# Patient Record
Sex: FEMALE | URBAN - METROPOLITAN AREA
[De-identification: names, ages, dates, MRNs, and addresses within clinical notes are randomized per-mention and may not be internally consistent; named-entity substitution may affect disease eponyms.]

---

## 2021-06-16 ENCOUNTER — OFFICE VISIT (OUTPATIENT)
Dept: URBAN - METROPOLITAN AREA TELEHEALTH 2 | Facility: TELEHEALTH | Age: 31
End: 2021-06-16
Payer: COMMERCIAL

## 2021-06-16 DIAGNOSIS — K50.819 CROHN'S DISEASE OF SMALL AND LARGE INTESTINES WITH COMPLICATION: ICD-10-CM

## 2021-06-16 PROCEDURE — 99204 OFFICE O/P NEW MOD 45 MIN: CPT | Performed by: INTERNAL MEDICINE

## 2021-06-16 NOTE — HPI-TODAY'S VISIT:
This is a 31-year-old -American female presents today for a new evaluation. She notes that she was diagnosed with Crohn's disease about 8 to 10 years ago. I do not have any records for review. She has not been on any long-term treatment. She states that she was offered an experimental drug but this would be costly and with potential side effects. She reports having had multiple hospitalizations for her Crohn's disease. She has had anal fissure/fistula which has required repair. She continues to have lower abdominal pain diarrhea 8-10 times per day with intermittent blood in stool. She has lost about 10 pounds over the last few months.  On the phone today is her sister to help with the history given the patient's neurologic disorders.  In the past they have opted for Eastern medicine, natural approaches to her Crohn's disease. They have been fearful of Western medicine and potential side effects of long-term medication use.

## 2021-08-12 ENCOUNTER — OFFICE VISIT (OUTPATIENT)
Dept: URBAN - METROPOLITAN AREA CLINIC 92 | Facility: CLINIC | Age: 31
End: 2021-08-12

## 2021-08-20 ENCOUNTER — OFFICE VISIT (OUTPATIENT)
Dept: URBAN - METROPOLITAN AREA SURGERY CENTER 16 | Facility: SURGERY CENTER | Age: 31
End: 2021-08-20

## 2021-08-31 ENCOUNTER — OFFICE VISIT (OUTPATIENT)
Dept: URBAN - METROPOLITAN AREA SURGERY CENTER 16 | Facility: SURGERY CENTER | Age: 31
End: 2021-08-31

## 2021-09-14 ENCOUNTER — DASHBOARD ENCOUNTERS (OUTPATIENT)
Age: 31
End: 2021-09-14

## 2021-09-14 ENCOUNTER — OFFICE VISIT (OUTPATIENT)
Dept: URBAN - METROPOLITAN AREA CLINIC 92 | Facility: CLINIC | Age: 31
End: 2021-09-14
Payer: COMMERCIAL

## 2021-09-14 DIAGNOSIS — K60.2 ANAL FISSURE: ICD-10-CM

## 2021-09-14 DIAGNOSIS — R11.2 NON-INTRACTABLE VOMITING WITH NAUSEA, UNSPECIFIED VOMITING TYPE: ICD-10-CM

## 2021-09-14 DIAGNOSIS — K50.819 CROHN'S DISEASE OF SMALL AND LARGE INTESTINES WITH COMPLICATION: ICD-10-CM

## 2021-09-14 PROCEDURE — 99214 OFFICE O/P EST MOD 30 MIN: CPT | Performed by: INTERNAL MEDICINE

## 2021-09-14 NOTE — HPI-TODAY'S VISIT:
This is 31-year-old -American female presents today for follow-up.  She is unaccompanied.  She notes that she has continued to have flares of her Crohn's disease.  She has nausea and vomiting when bending over and becoming dizzy.  She does note daily diarrhea with 6-7 bowel meds per day.  She does have intermittent blood in her stool.  Of note she was recently admitted with sepsis to [facility] this was due to a pharyngeal abscess.  This required incision and drainage.  Again I have no records for review regarding her Crohn's disease diagnosis, prior evaluation, and/or treatment.  She believes that all of her care for her Crohn's disease were provided by Dr. Jonnathan Perez.  I was able to pull his records from [facility].  It appears that he is a colorectal surgeon and treated her only for an anal fissure.  At which time he did a flexible sigmoidoscopy without any evidence of inflammatory bowel disease.  She is unsure if she is having another colonoscopy and at times is aggressive and changes her story depending on the line of questioning.  On her discharge paperwork there is some discussion regarding the ability to substantiate her diagnosis and multiple medical conditions including uterine cancer and Crohn's disease.

## 2021-09-17 PROBLEM — 71833008: Status: ACTIVE | Noted: 2021-06-16

## 2021-10-15 ENCOUNTER — OFFICE VISIT (OUTPATIENT)
Dept: URBAN - METROPOLITAN AREA SURGERY CENTER 16 | Facility: SURGERY CENTER | Age: 31
End: 2021-10-15

## 2021-10-29 ENCOUNTER — OFFICE VISIT (OUTPATIENT)
Dept: URBAN - METROPOLITAN AREA SURGERY CENTER 16 | Facility: SURGERY CENTER | Age: 31
End: 2021-10-29
Payer: COMMERCIAL

## 2021-10-29 DIAGNOSIS — K50.80 CROHN'S COLITIS: ICD-10-CM

## 2021-10-29 DIAGNOSIS — K31.89 ACQUIRED DEFORMITY OF DUODENUM: ICD-10-CM

## 2021-10-29 DIAGNOSIS — R10.84 ABDOMINAL CRAMPING, GENERALIZED: ICD-10-CM

## 2021-10-29 PROCEDURE — G8907 PT DOC NO EVENTS ON DISCHARG: HCPCS | Performed by: INTERNAL MEDICINE

## 2021-10-29 PROCEDURE — 45380 COLONOSCOPY AND BIOPSY: CPT | Performed by: INTERNAL MEDICINE

## 2021-10-29 PROCEDURE — 43239 EGD BIOPSY SINGLE/MULTIPLE: CPT | Performed by: INTERNAL MEDICINE

## 2022-02-11 ENCOUNTER — TELEPHONE ENCOUNTER (OUTPATIENT)
Dept: URBAN - METROPOLITAN AREA CLINIC 23 | Facility: CLINIC | Age: 32
End: 2022-02-11